# Patient Record
Sex: FEMALE | Race: OTHER | HISPANIC OR LATINO | URBAN - METROPOLITAN AREA
[De-identification: names, ages, dates, MRNs, and addresses within clinical notes are randomized per-mention and may not be internally consistent; named-entity substitution may affect disease eponyms.]

---

## 2022-07-18 ENCOUNTER — OUTPATIENT (OUTPATIENT)
Dept: OUTPATIENT SERVICES | Facility: HOSPITAL | Age: 37
LOS: 1 days | End: 2022-07-18
Payer: COMMERCIAL

## 2022-07-18 ENCOUNTER — TRANSCRIPTION ENCOUNTER (OUTPATIENT)
Age: 37
End: 2022-07-18

## 2022-07-18 DIAGNOSIS — Z11.52 ENCOUNTER FOR SCREENING FOR COVID-19: ICD-10-CM

## 2022-07-18 LAB — SARS-COV-2 RNA SPEC QL NAA+PROBE: DETECTED

## 2022-07-18 PROCEDURE — U0003: CPT

## 2022-07-18 PROCEDURE — C9803: CPT

## 2022-07-18 PROCEDURE — U0005: CPT

## 2022-07-19 ENCOUNTER — TRANSCRIPTION ENCOUNTER (OUTPATIENT)
Age: 37
End: 2022-07-19

## 2022-07-19 ENCOUNTER — OUTPATIENT (OUTPATIENT)
Dept: OUTPATIENT SERVICES | Facility: HOSPITAL | Age: 37
LOS: 1 days | End: 2022-07-19
Payer: COMMERCIAL

## 2022-07-19 ENCOUNTER — RESULT REVIEW (OUTPATIENT)
Age: 37
End: 2022-07-19

## 2022-07-19 VITALS
OXYGEN SATURATION: 100 % | HEART RATE: 71 BPM | DIASTOLIC BLOOD PRESSURE: 55 MMHG | RESPIRATION RATE: 16 BRPM | SYSTOLIC BLOOD PRESSURE: 99 MMHG | TEMPERATURE: 98 F

## 2022-07-19 VITALS
RESPIRATION RATE: 18 BRPM | DIASTOLIC BLOOD PRESSURE: 76 MMHG | HEART RATE: 100 BPM | TEMPERATURE: 98 F | SYSTOLIC BLOOD PRESSURE: 121 MMHG | HEIGHT: 61 IN | WEIGHT: 149.03 LBS | OXYGEN SATURATION: 100 %

## 2022-07-19 DIAGNOSIS — O02.1 MISSED ABORTION: ICD-10-CM

## 2022-07-19 LAB
APTT BLD: 26 SEC — LOW (ref 27.5–35.5)
BLD GP AB SCN SERPL QL: NEGATIVE — SIGNIFICANT CHANGE UP
FIBRINOGEN PPP-MCNC: 522 MG/DL — HIGH (ref 330–520)
HCT VFR BLD CALC: 42.7 % — SIGNIFICANT CHANGE UP (ref 34.5–45)
HGB BLD-MCNC: 14.2 G/DL — SIGNIFICANT CHANGE UP (ref 11.5–15.5)
INR BLD: 1.01 RATIO — SIGNIFICANT CHANGE UP (ref 0.88–1.16)
MCHC RBC-ENTMCNC: 28.5 PG — SIGNIFICANT CHANGE UP (ref 27–34)
MCHC RBC-ENTMCNC: 33.3 GM/DL — SIGNIFICANT CHANGE UP (ref 32–36)
MCV RBC AUTO: 85.7 FL — SIGNIFICANT CHANGE UP (ref 80–100)
NRBC # BLD: 0 /100 WBCS — SIGNIFICANT CHANGE UP (ref 0–0)
PLATELET # BLD AUTO: 185 K/UL — SIGNIFICANT CHANGE UP (ref 150–400)
PROTHROM AB SERPL-ACNC: 11.7 SEC — SIGNIFICANT CHANGE UP (ref 10.5–13.4)
RBC # BLD: 4.98 M/UL — SIGNIFICANT CHANGE UP (ref 3.8–5.2)
RBC # FLD: 13.1 % — SIGNIFICANT CHANGE UP (ref 10.3–14.5)
RH IG SCN BLD-IMP: POSITIVE — SIGNIFICANT CHANGE UP
RH IG SCN BLD-IMP: POSITIVE — SIGNIFICANT CHANGE UP
WBC # BLD: 6.5 K/UL — SIGNIFICANT CHANGE UP (ref 3.8–10.5)
WBC # FLD AUTO: 6.5 K/UL — SIGNIFICANT CHANGE UP (ref 3.8–10.5)

## 2022-07-19 PROCEDURE — 88305 TISSUE EXAM BY PATHOLOGIST: CPT | Mod: 26

## 2022-07-19 PROCEDURE — 85384 FIBRINOGEN ACTIVITY: CPT

## 2022-07-19 PROCEDURE — 59820 CARE OF MISCARRIAGE: CPT

## 2022-07-19 PROCEDURE — 36415 COLL VENOUS BLD VENIPUNCTURE: CPT

## 2022-07-19 PROCEDURE — 86850 RBC ANTIBODY SCREEN: CPT

## 2022-07-19 PROCEDURE — 88233 TISSUE CULTURE SKIN/BIOPSY: CPT

## 2022-07-19 PROCEDURE — 88305 TISSUE EXAM BY PATHOLOGIST: CPT

## 2022-07-19 PROCEDURE — 88280 CHROMOSOME KARYOTYPE STUDY: CPT

## 2022-07-19 PROCEDURE — 85027 COMPLETE CBC AUTOMATED: CPT

## 2022-07-19 PROCEDURE — 85610 PROTHROMBIN TIME: CPT

## 2022-07-19 PROCEDURE — 88264 CHROMOSOME ANALYSIS 20-25: CPT

## 2022-07-19 PROCEDURE — 86901 BLOOD TYPING SEROLOGIC RH(D): CPT

## 2022-07-19 PROCEDURE — 85730 THROMBOPLASTIN TIME PARTIAL: CPT

## 2022-07-19 PROCEDURE — 86900 BLOOD TYPING SEROLOGIC ABO: CPT

## 2022-07-19 RX ORDER — SODIUM CHLORIDE 9 MG/ML
1000 INJECTION, SOLUTION INTRAVENOUS
Refills: 0 | Status: DISCONTINUED | OUTPATIENT
Start: 2022-07-19 | End: 2022-07-19

## 2022-07-19 RX ORDER — ONDANSETRON 8 MG/1
4 TABLET, FILM COATED ORAL ONCE
Refills: 0 | Status: DISCONTINUED | OUTPATIENT
Start: 2022-07-19 | End: 2022-07-19

## 2022-07-19 RX ORDER — SODIUM CHLORIDE 9 MG/ML
3 INJECTION INTRAMUSCULAR; INTRAVENOUS; SUBCUTANEOUS EVERY 8 HOURS
Refills: 0 | Status: DISCONTINUED | OUTPATIENT
Start: 2022-07-19 | End: 2022-08-02

## 2022-07-19 RX ORDER — FENTANYL CITRATE 50 UG/ML
25 INJECTION INTRAVENOUS
Refills: 0 | Status: DISCONTINUED | OUTPATIENT
Start: 2022-07-19 | End: 2022-07-19

## 2022-07-19 NOTE — ASU DISCHARGE PLAN (ADULT/PEDIATRIC) - CARE PROVIDER_API CALL
Barbara Hill (DO)  [facility] [address], [address]  [place] [address]  Phone: [phone]  Fax: [phone]  Follow Up Time: 2 weeks

## 2022-07-19 NOTE — BRIEF OPERATIVE NOTE - OPERATION/FINDINGS
anteverted 11 week size uterus mobile, no FH confirmed with sono prior to procedure, normal adnexa B/L

## 2022-07-19 NOTE — H&P ADULT - HISTORY OF PRESENT ILLNESS
ROBBY BLAND  36y  Female [id number]    HPI: Pt is a 30yo  @12wks GA presenting for D&C for missed . Pt denies any complaints at this time. Denies fevers, chills, N/V, CP, SOB, abdominal pain, pelvic cramping, vaginal bleeding, vaginal discharge.    Obgyn: Dr. Jones  ObHx: FT C/S for failure of descent   PMH: denies  PSH: C/S , breast augmentation   Meds: PNV  Allx: NKDA  Social: occasional alcohol

## 2022-07-19 NOTE — ASU DISCHARGE PLAN (ADULT/PEDIATRIC) - ACTIVITY LEVEL
No excercise/No heavy lifting/No sports/gym/No weight bearing/Nothing per vagina/No tub baths/No douching/No tampons/No intercourse

## 2022-07-19 NOTE — BRIEF OPERATIVE NOTE - NSICDXBRIEFPROCEDURE_GEN_ALL_CORE_FT
PROCEDURES:  Exam under anesthesia, pelvic 19-Jul-2022 15:34:17  Barbara Hill  Dilation and curettage, uterus, using suction, with US guidance 19-Jul-2022 15:34:31  Barbara Hill

## 2022-07-19 NOTE — ASU DISCHARGE PLAN (ADULT/PEDIATRIC) - NO INTERCOURSE DURATION
.Injection given without difficulties.Bandaid applied. Patient instructed to stay in the clinic for 15 minutes. Patient verbalized understanding and will notify nurse with any complaints.    
4 weeks

## 2022-07-19 NOTE — H&P ADULT - NSHPPHYSICALEXAM_GEN_ALL_CORE
VS  T(C): 36.6 (07-19-22 @ 12:23)  HR: 100 (07-19-22 @ 12:23)  BP: 121/76 (07-19-22 @ 12:23)  RR: 18 (07-19-22 @ 12:23)  SpO2: 100% (07-19-22 @ 12:23)    Physical Exam:  General: NAD  Lungs: nonlabored breathing on RA  Abdomen: Soft, non-tender, non-distended

## 2022-07-19 NOTE — ASU PREOP CHECKLIST - PATIENT SENT TO
Please have the patient take an extra Lasix today and tomorrow along with an extra potassium today and tomorrow.  Please have the patient check a BMP on Monday or Tuesday of next week.  Order has been placed. maura   operating room

## 2022-07-19 NOTE — H&P ADULT - ASSESSMENT
Pt is a 37yo  @12wks GA presenting for D&C for MAB.    -NPO  -IV lock  -anesthesia consult  -pre-op labs ordered, T&S ordered    Kathrine, PGY3  Dr. Hill aware

## 2022-07-19 NOTE — ASU DISCHARGE PLAN (ADULT/PEDIATRIC) - CALL YOUR DOCTOR IF YOU HAVE ANY OF THE FOLLOWING:
Swelling that gets worse/Pain not relieved by Medications/Fever greater than (need to indicate Fahrenheit or Celsius)/Wound/Surgical Site with redness, or foul smelling discharge or pus/Unable to urinate/Excessive diarrhea/Inability to tolerate liquids or foods

## 2022-07-19 NOTE — ASU DISCHARGE PLAN (ADULT/PEDIATRIC) - NS MD DC FALL RISK RISK
For information on Fall & Injury Prevention, visit: [email or web address] OR  [email or web address] OR  https://www.cdc.gov/steadi/patient.html

## 2022-07-19 NOTE — H&P ADULT - ATTENDING COMMENTS
Patient seen. Her for missed AB 10w 4 days. No bleeding or cramping.     A+ blood type    Consents signed. Risks reviewed including bleeding infection damage to surrounding structures, need for laparascopy, scar tissue  Would like to send genetics  All questions answered    Barbara Hill DO

## 2022-07-22 LAB — SURGICAL PATHOLOGY STUDY: SIGNIFICANT CHANGE UP

## 2022-08-01 LAB — CHROM ANALY OVERALL INTERP SPEC-IMP: SIGNIFICANT CHANGE UP

## 2022-09-06 PROBLEM — Z00.00 ENCOUNTER FOR PREVENTIVE HEALTH EXAMINATION: Status: ACTIVE | Noted: 2022-09-06

## 2022-09-07 ENCOUNTER — APPOINTMENT (OUTPATIENT)
Dept: ANTEPARTUM | Facility: CLINIC | Age: 37
End: 2022-09-07

## 2023-06-21 ENCOUNTER — ASOB RESULT (OUTPATIENT)
Age: 38
End: 2023-06-21

## 2023-06-21 ENCOUNTER — APPOINTMENT (OUTPATIENT)
Dept: ANTEPARTUM | Facility: CLINIC | Age: 38
End: 2023-06-21
Payer: COMMERCIAL

## 2023-06-21 ENCOUNTER — NON-APPOINTMENT (OUTPATIENT)
Age: 38
End: 2023-06-21

## 2023-06-21 PROCEDURE — 76811 OB US DETAILED SNGL FETUS: CPT

## 2023-06-21 PROCEDURE — 76817 TRANSVAGINAL US OBSTETRIC: CPT | Mod: 59

## 2023-08-17 ENCOUNTER — OUTPATIENT (OUTPATIENT)
Dept: OUTPATIENT SERVICES | Facility: HOSPITAL | Age: 38
LOS: 1 days | End: 2023-08-17
Payer: COMMERCIAL

## 2023-08-17 ENCOUNTER — APPOINTMENT (OUTPATIENT)
Dept: ULTRASOUND IMAGING | Facility: CLINIC | Age: 38
End: 2023-08-17
Payer: COMMERCIAL

## 2023-08-17 DIAGNOSIS — N63.0 UNSPECIFIED LUMP IN UNSPECIFIED BREAST: ICD-10-CM

## 2023-08-17 PROCEDURE — 76641 ULTRASOUND BREAST COMPLETE: CPT | Mod: 26,50

## 2023-08-17 PROCEDURE — 76641 ULTRASOUND BREAST COMPLETE: CPT

## 2023-09-13 ENCOUNTER — APPOINTMENT (OUTPATIENT)
Dept: ANTEPARTUM | Facility: CLINIC | Age: 38
End: 2023-09-13
Payer: COMMERCIAL

## 2023-09-13 ENCOUNTER — ASOB RESULT (OUTPATIENT)
Age: 38
End: 2023-09-13

## 2023-09-13 PROCEDURE — 76816 OB US FOLLOW-UP PER FETUS: CPT

## 2023-10-19 ENCOUNTER — OUTPATIENT (OUTPATIENT)
Dept: OUTPATIENT SERVICES | Facility: HOSPITAL | Age: 38
LOS: 1 days | End: 2023-10-19
Payer: COMMERCIAL

## 2023-10-19 VITALS
OXYGEN SATURATION: 100 % | HEIGHT: 61 IN | RESPIRATION RATE: 15 BRPM | HEART RATE: 99 BPM | WEIGHT: 293 LBS | DIASTOLIC BLOOD PRESSURE: 81 MMHG | SYSTOLIC BLOOD PRESSURE: 114 MMHG | TEMPERATURE: 98 F

## 2023-10-19 DIAGNOSIS — Z98.82 BREAST IMPLANT STATUS: Chronic | ICD-10-CM

## 2023-10-19 DIAGNOSIS — O34.211 MATERNAL CARE FOR LOW TRANSVERSE SCAR FROM PREVIOUS CESAREAN DELIVERY: ICD-10-CM

## 2023-10-19 DIAGNOSIS — Z98.890 OTHER SPECIFIED POSTPROCEDURAL STATES: Chronic | ICD-10-CM

## 2023-10-19 DIAGNOSIS — Z98.891 HISTORY OF UTERINE SCAR FROM PREVIOUS SURGERY: ICD-10-CM

## 2023-10-19 DIAGNOSIS — Z98.891 HISTORY OF UTERINE SCAR FROM PREVIOUS SURGERY: Chronic | ICD-10-CM

## 2023-10-19 DIAGNOSIS — Z01.818 ENCOUNTER FOR OTHER PREPROCEDURAL EXAMINATION: ICD-10-CM

## 2023-10-19 LAB
BLD GP AB SCN SERPL QL: NEGATIVE — SIGNIFICANT CHANGE UP
BLD GP AB SCN SERPL QL: NEGATIVE — SIGNIFICANT CHANGE UP
HCT VFR BLD CALC: 40.6 % — SIGNIFICANT CHANGE UP (ref 34.5–45)
HCT VFR BLD CALC: 40.6 % — SIGNIFICANT CHANGE UP (ref 34.5–45)
HGB BLD-MCNC: 13.8 G/DL — SIGNIFICANT CHANGE UP (ref 11.5–15.5)
HGB BLD-MCNC: 13.8 G/DL — SIGNIFICANT CHANGE UP (ref 11.5–15.5)
MCHC RBC-ENTMCNC: 29.4 PG — SIGNIFICANT CHANGE UP (ref 27–34)
MCHC RBC-ENTMCNC: 29.4 PG — SIGNIFICANT CHANGE UP (ref 27–34)
MCHC RBC-ENTMCNC: 34 GM/DL — SIGNIFICANT CHANGE UP (ref 32–36)
MCHC RBC-ENTMCNC: 34 GM/DL — SIGNIFICANT CHANGE UP (ref 32–36)
MCV RBC AUTO: 86.4 FL — SIGNIFICANT CHANGE UP (ref 80–100)
MCV RBC AUTO: 86.4 FL — SIGNIFICANT CHANGE UP (ref 80–100)
NRBC # BLD: 0 /100 WBCS — SIGNIFICANT CHANGE UP (ref 0–0)
NRBC # BLD: 0 /100 WBCS — SIGNIFICANT CHANGE UP (ref 0–0)
PLATELET # BLD AUTO: 178 K/UL — SIGNIFICANT CHANGE UP (ref 150–400)
PLATELET # BLD AUTO: 178 K/UL — SIGNIFICANT CHANGE UP (ref 150–400)
RBC # BLD: 4.7 M/UL — SIGNIFICANT CHANGE UP (ref 3.8–5.2)
RBC # BLD: 4.7 M/UL — SIGNIFICANT CHANGE UP (ref 3.8–5.2)
RBC # FLD: 14 % — SIGNIFICANT CHANGE UP (ref 10.3–14.5)
RBC # FLD: 14 % — SIGNIFICANT CHANGE UP (ref 10.3–14.5)
RH IG SCN BLD-IMP: POSITIVE — SIGNIFICANT CHANGE UP
RH IG SCN BLD-IMP: POSITIVE — SIGNIFICANT CHANGE UP
WBC # BLD: 13.41 K/UL — HIGH (ref 3.8–10.5)
WBC # BLD: 13.41 K/UL — HIGH (ref 3.8–10.5)
WBC # FLD AUTO: 13.41 K/UL — HIGH (ref 3.8–10.5)
WBC # FLD AUTO: 13.41 K/UL — HIGH (ref 3.8–10.5)

## 2023-10-19 PROCEDURE — 86850 RBC ANTIBODY SCREEN: CPT

## 2023-10-19 PROCEDURE — 36415 COLL VENOUS BLD VENIPUNCTURE: CPT

## 2023-10-19 PROCEDURE — 86901 BLOOD TYPING SEROLOGIC RH(D): CPT

## 2023-10-19 PROCEDURE — 86900 BLOOD TYPING SEROLOGIC ABO: CPT

## 2023-10-19 PROCEDURE — 85027 COMPLETE CBC AUTOMATED: CPT

## 2023-10-19 PROCEDURE — G0463: CPT

## 2023-10-19 RX ORDER — LEVOTHYROXINE SODIUM 125 MCG
1 TABLET ORAL
Refills: 0 | DISCHARGE

## 2023-10-19 RX ORDER — SODIUM CHLORIDE 9 MG/ML
1000 INJECTION, SOLUTION INTRAVENOUS
Refills: 0 | Status: DISCONTINUED | OUTPATIENT
Start: 2023-11-03 | End: 2023-11-03

## 2023-10-19 NOTE — OB PST NOTE - HISTORY OF PRESENT ILLNESS
36yo  @12wks GA presenting for D&C for missed . Pt denies any complaints at this time. Denies fevers, chills, N/V, CP, SOB, abdominal pain, pelvic cramping, vaginal bleeding, vaginal discharge.   36yo  BOBBY @37ks SONU SMITH c/section, presents to PST scheduled for repeat c/section on 11/3.  36yo  IUP @37wks GA. PMH hypothyroid. PSH c/section, current pregnancy uneventful, pt stated she was taking baby aspirin prior to 36 weeks gestation due to her age and the latest research study. now presents to PST scheduled for repeat c/section on 11/3.

## 2023-10-19 NOTE — OB PST NOTE - NSICDXPASTSURGICALHX_GEN_ALL_CORE_FT
PAST SURGICAL HISTORY:  S/P  section     S/P D&C (status post dilation and curettage)      PAST SURGICAL HISTORY:  H/O breast augmentation     S/P  section     S/P D&C (status post dilation and curettage)

## 2023-10-19 NOTE — OB PST NOTE - FALL HARM RISK - UNIVERSAL INTERVENTIONS
Bed in lowest position, wheels locked, appropriate side rails in place/Call bell, personal items and telephone in reach/Instruct patient to call for assistance before getting out of bed or chair/Non-slip footwear when patient is out of bed/[place] to call system/Purposeful Proactive Rounding/Room/bathroom lighting operational, light cord in reach

## 2023-10-19 NOTE — OB PST NOTE - ACTIVITY
prior to pregnancy, no activity limitation prior to pregnancy, no activity limitation, able to jog 1 block

## 2023-11-02 ENCOUNTER — TRANSCRIPTION ENCOUNTER (OUTPATIENT)
Age: 38
End: 2023-11-02

## 2023-11-03 ENCOUNTER — TRANSCRIPTION ENCOUNTER (OUTPATIENT)
Age: 38
End: 2023-11-03

## 2023-11-03 ENCOUNTER — INPATIENT (INPATIENT)
Facility: HOSPITAL | Age: 38
LOS: 2 days | Discharge: HOME | End: 2023-11-06
Attending: OBSTETRICS & GYNECOLOGY | Admitting: OBSTETRICS & GYNECOLOGY
Payer: COMMERCIAL

## 2023-11-03 VITALS — DIASTOLIC BLOOD PRESSURE: 76 MMHG | HEART RATE: 95 BPM | SYSTOLIC BLOOD PRESSURE: 126 MMHG

## 2023-11-03 DIAGNOSIS — Z98.890 OTHER SPECIFIED POSTPROCEDURAL STATES: Chronic | ICD-10-CM

## 2023-11-03 DIAGNOSIS — Z98.891 HISTORY OF UTERINE SCAR FROM PREVIOUS SURGERY: Chronic | ICD-10-CM

## 2023-11-03 DIAGNOSIS — Z98.82 BREAST IMPLANT STATUS: Chronic | ICD-10-CM

## 2023-11-03 DIAGNOSIS — O34.211 MATERNAL CARE FOR LOW TRANSVERSE SCAR FROM PREVIOUS CESAREAN DELIVERY: ICD-10-CM

## 2023-11-03 LAB
APTT BLD: 25 SEC — SIGNIFICANT CHANGE UP (ref 24.5–35.6)
APTT BLD: 25 SEC — SIGNIFICANT CHANGE UP (ref 24.5–35.6)
FIBRINOGEN PPP-MCNC: 352 MG/DL — SIGNIFICANT CHANGE UP (ref 200–445)
FIBRINOGEN PPP-MCNC: 352 MG/DL — SIGNIFICANT CHANGE UP (ref 200–445)
HCT VFR BLD CALC: 34.5 % — SIGNIFICANT CHANGE UP (ref 34.5–45)
HCT VFR BLD CALC: 34.5 % — SIGNIFICANT CHANGE UP (ref 34.5–45)
HGB BLD-MCNC: 11.6 G/DL — SIGNIFICANT CHANGE UP (ref 11.5–15.5)
HGB BLD-MCNC: 11.6 G/DL — SIGNIFICANT CHANGE UP (ref 11.5–15.5)
INR BLD: 0.94 RATIO — SIGNIFICANT CHANGE UP (ref 0.85–1.18)
INR BLD: 0.94 RATIO — SIGNIFICANT CHANGE UP (ref 0.85–1.18)
MCHC RBC-ENTMCNC: 28.9 PG — SIGNIFICANT CHANGE UP (ref 27–34)
MCHC RBC-ENTMCNC: 28.9 PG — SIGNIFICANT CHANGE UP (ref 27–34)
MCHC RBC-ENTMCNC: 33.6 GM/DL — SIGNIFICANT CHANGE UP (ref 32–36)
MCHC RBC-ENTMCNC: 33.6 GM/DL — SIGNIFICANT CHANGE UP (ref 32–36)
MCV RBC AUTO: 86 FL — SIGNIFICANT CHANGE UP (ref 80–100)
MCV RBC AUTO: 86 FL — SIGNIFICANT CHANGE UP (ref 80–100)
NRBC # BLD: 0 /100 WBCS — SIGNIFICANT CHANGE UP (ref 0–0)
NRBC # BLD: 0 /100 WBCS — SIGNIFICANT CHANGE UP (ref 0–0)
PLATELET # BLD AUTO: 177 K/UL — SIGNIFICANT CHANGE UP (ref 150–400)
PLATELET # BLD AUTO: 177 K/UL — SIGNIFICANT CHANGE UP (ref 150–400)
PROTHROM AB SERPL-ACNC: 10.4 SEC — SIGNIFICANT CHANGE UP (ref 9.5–13)
PROTHROM AB SERPL-ACNC: 10.4 SEC — SIGNIFICANT CHANGE UP (ref 9.5–13)
RBC # BLD: 4.01 M/UL — SIGNIFICANT CHANGE UP (ref 3.8–5.2)
RBC # BLD: 4.01 M/UL — SIGNIFICANT CHANGE UP (ref 3.8–5.2)
RBC # FLD: 13.8 % — SIGNIFICANT CHANGE UP (ref 10.3–14.5)
RBC # FLD: 13.8 % — SIGNIFICANT CHANGE UP (ref 10.3–14.5)
T PALLIDUM AB TITR SER: NEGATIVE — SIGNIFICANT CHANGE UP
T PALLIDUM AB TITR SER: NEGATIVE — SIGNIFICANT CHANGE UP
WBC # BLD: 19.84 K/UL — HIGH (ref 3.8–10.5)
WBC # BLD: 19.84 K/UL — HIGH (ref 3.8–10.5)
WBC # FLD AUTO: 19.84 K/UL — HIGH (ref 3.8–10.5)
WBC # FLD AUTO: 19.84 K/UL — HIGH (ref 3.8–10.5)

## 2023-11-03 PROCEDURE — 88302 TISSUE EXAM BY PATHOLOGIST: CPT | Mod: 26

## 2023-11-03 DEVICE — SURGICEL POWDER 3 GRAMS: Type: IMPLANTABLE DEVICE | Status: FUNCTIONAL

## 2023-11-03 RX ORDER — ONDANSETRON 8 MG/1
4 TABLET, FILM COATED ORAL EVERY 6 HOURS
Refills: 0 | Status: DISCONTINUED | OUTPATIENT
Start: 2023-11-03 | End: 2023-11-06

## 2023-11-03 RX ORDER — SODIUM CHLORIDE 9 MG/ML
1000 INJECTION, SOLUTION INTRAVENOUS
Refills: 0 | Status: DISCONTINUED | OUTPATIENT
Start: 2023-11-03 | End: 2023-11-06

## 2023-11-03 RX ORDER — IBUPROFEN 200 MG
600 TABLET ORAL EVERY 6 HOURS
Refills: 0 | Status: DISCONTINUED | OUTPATIENT
Start: 2023-11-03 | End: 2023-11-06

## 2023-11-03 RX ORDER — DEXAMETHASONE 0.5 MG/5ML
4 ELIXIR ORAL EVERY 6 HOURS
Refills: 0 | Status: DISCONTINUED | OUTPATIENT
Start: 2023-11-03 | End: 2023-11-06

## 2023-11-03 RX ORDER — CITRIC ACID/SODIUM CITRATE 300-500 MG
15 SOLUTION, ORAL ORAL ONCE
Refills: 0 | Status: COMPLETED | OUTPATIENT
Start: 2023-11-03 | End: 2023-11-03

## 2023-11-03 RX ORDER — CEFAZOLIN SODIUM 1 G
2000 VIAL (EA) INJECTION ONCE
Refills: 0 | Status: COMPLETED | OUTPATIENT
Start: 2023-11-03 | End: 2023-11-03

## 2023-11-03 RX ORDER — OXYCODONE HYDROCHLORIDE 5 MG/1
5 TABLET ORAL
Refills: 0 | Status: COMPLETED | OUTPATIENT
Start: 2023-11-03 | End: 2023-11-10

## 2023-11-03 RX ORDER — LANOLIN
1 OINTMENT (GRAM) TOPICAL EVERY 6 HOURS
Refills: 0 | Status: DISCONTINUED | OUTPATIENT
Start: 2023-11-03 | End: 2023-11-06

## 2023-11-03 RX ORDER — KETOROLAC TROMETHAMINE 30 MG/ML
30 SYRINGE (ML) INJECTION EVERY 6 HOURS
Refills: 0 | Status: DISCONTINUED | OUTPATIENT
Start: 2023-11-03 | End: 2023-11-04

## 2023-11-03 RX ORDER — IBUPROFEN 200 MG
600 TABLET ORAL EVERY 6 HOURS
Refills: 0 | Status: COMPLETED | OUTPATIENT
Start: 2023-11-03 | End: 2024-10-01

## 2023-11-03 RX ORDER — LEVOTHYROXINE SODIUM 125 MCG
25 TABLET ORAL DAILY
Refills: 0 | Status: DISCONTINUED | OUTPATIENT
Start: 2023-11-03 | End: 2023-11-06

## 2023-11-03 RX ORDER — OXYTOCIN 10 UNIT/ML
333.33 VIAL (ML) INJECTION
Qty: 20 | Refills: 0 | Status: COMPLETED | OUTPATIENT
Start: 2023-11-03 | End: 2023-11-03

## 2023-11-03 RX ORDER — DIPHENHYDRAMINE HCL 50 MG
25 CAPSULE ORAL EVERY 6 HOURS
Refills: 0 | Status: DISCONTINUED | OUTPATIENT
Start: 2023-11-03 | End: 2023-11-06

## 2023-11-03 RX ORDER — INFLUENZA VIRUS VACCINE 15; 15; 15; 15 UG/.5ML; UG/.5ML; UG/.5ML; UG/.5ML
0.5 SUSPENSION INTRAMUSCULAR ONCE
Refills: 0 | Status: DISCONTINUED | OUTPATIENT
Start: 2023-11-03 | End: 2023-11-06

## 2023-11-03 RX ORDER — MAGNESIUM HYDROXIDE 400 MG/1
30 TABLET, CHEWABLE ORAL
Refills: 0 | Status: DISCONTINUED | OUTPATIENT
Start: 2023-11-03 | End: 2023-11-06

## 2023-11-03 RX ORDER — OXYTOCIN 10 UNIT/ML
333.33 VIAL (ML) INJECTION
Qty: 20 | Refills: 0 | Status: DISCONTINUED | OUTPATIENT
Start: 2023-11-03 | End: 2023-11-06

## 2023-11-03 RX ORDER — ACETAMINOPHEN 500 MG
975 TABLET ORAL
Refills: 0 | Status: DISCONTINUED | OUTPATIENT
Start: 2023-11-03 | End: 2023-11-06

## 2023-11-03 RX ORDER — CHLORHEXIDINE GLUCONATE 213 G/1000ML
1 SOLUTION TOPICAL ONCE
Refills: 0 | Status: COMPLETED | OUTPATIENT
Start: 2023-11-03 | End: 2023-11-03

## 2023-11-03 RX ORDER — MORPHINE SULFATE 50 MG/1
0.1 CAPSULE, EXTENDED RELEASE ORAL ONCE
Refills: 0 | Status: DISCONTINUED | OUTPATIENT
Start: 2023-11-03 | End: 2023-11-04

## 2023-11-03 RX ORDER — TETANUS TOXOID, REDUCED DIPHTHERIA TOXOID AND ACELLULAR PERTUSSIS VACCINE, ADSORBED 5; 2.5; 8; 8; 2.5 [IU]/.5ML; [IU]/.5ML; UG/.5ML; UG/.5ML; UG/.5ML
0.5 SUSPENSION INTRAMUSCULAR ONCE
Refills: 0 | Status: DISCONTINUED | OUTPATIENT
Start: 2023-11-03 | End: 2023-11-06

## 2023-11-03 RX ORDER — FAMOTIDINE 10 MG/ML
20 INJECTION INTRAVENOUS ONCE
Refills: 0 | Status: COMPLETED | OUTPATIENT
Start: 2023-11-03 | End: 2023-11-03

## 2023-11-03 RX ORDER — NALBUPHINE HYDROCHLORIDE 10 MG/ML
2.5 INJECTION, SOLUTION INTRAMUSCULAR; INTRAVENOUS; SUBCUTANEOUS EVERY 6 HOURS
Refills: 0 | Status: DISCONTINUED | OUTPATIENT
Start: 2023-11-03 | End: 2023-11-06

## 2023-11-03 RX ORDER — OXYCODONE HYDROCHLORIDE 5 MG/1
5 TABLET ORAL
Refills: 0 | Status: DISCONTINUED | OUTPATIENT
Start: 2023-11-03 | End: 2023-11-03

## 2023-11-03 RX ORDER — HEPARIN SODIUM 5000 [USP'U]/ML
5000 INJECTION INTRAVENOUS; SUBCUTANEOUS EVERY 12 HOURS
Refills: 0 | Status: DISCONTINUED | OUTPATIENT
Start: 2023-11-03 | End: 2023-11-06

## 2023-11-03 RX ORDER — SIMETHICONE 80 MG/1
80 TABLET, CHEWABLE ORAL EVERY 4 HOURS
Refills: 0 | Status: DISCONTINUED | OUTPATIENT
Start: 2023-11-03 | End: 2023-11-06

## 2023-11-03 RX ORDER — OXYCODONE HYDROCHLORIDE 5 MG/1
5 TABLET ORAL ONCE
Refills: 0 | Status: DISCONTINUED | OUTPATIENT
Start: 2023-11-03 | End: 2023-11-06

## 2023-11-03 RX ORDER — NALOXONE HYDROCHLORIDE 4 MG/.1ML
0.1 SPRAY NASAL
Refills: 0 | Status: DISCONTINUED | OUTPATIENT
Start: 2023-11-03 | End: 2023-11-06

## 2023-11-03 RX ADMIN — Medication 30 MILLIGRAM(S): at 20:15

## 2023-11-03 RX ADMIN — Medication 15 MILLILITER(S): at 07:16

## 2023-11-03 RX ADMIN — Medication 975 MILLIGRAM(S): at 23:35

## 2023-11-03 RX ADMIN — Medication 30 MILLIGRAM(S): at 20:50

## 2023-11-03 RX ADMIN — FAMOTIDINE 20 MILLIGRAM(S): 10 INJECTION INTRAVENOUS at 07:16

## 2023-11-03 RX ADMIN — CHLORHEXIDINE GLUCONATE 1 APPLICATION(S): 213 SOLUTION TOPICAL at 06:00

## 2023-11-03 RX ADMIN — SODIUM CHLORIDE 125 MILLILITER(S): 9 INJECTION, SOLUTION INTRAVENOUS at 07:16

## 2023-11-03 RX ADMIN — Medication 975 MILLIGRAM(S): at 18:21

## 2023-11-03 RX ADMIN — Medication 30 MILLIGRAM(S): at 18:21

## 2023-11-03 RX ADMIN — HEPARIN SODIUM 5000 UNIT(S): 5000 INJECTION INTRAVENOUS; SUBCUTANEOUS at 20:15

## 2023-11-03 RX ADMIN — Medication 1000 MILLIUNIT(S)/MIN: at 12:09

## 2023-11-03 RX ADMIN — Medication 30 MILLIGRAM(S): at 15:09

## 2023-11-03 RX ADMIN — Medication 975 MILLIGRAM(S): at 17:38

## 2023-11-03 NOTE — OB PROVIDER H&P - HISTORY OF PRESENT ILLNESS
Admission H&P    Subjective  HPI: 37yoF  @39w5d gestational age presents for scheduled repeat  section. +FM. -LOF. -CTXs. -VB. Pt denies any other concerns.    – PNC: denies prenatal issues. GBS _.  EFW _g by sono.    – OB Hx:  G1 (): FT primary c/s, arrest of descent  G2 (): MAB s/p D&C  – GYN Hx: denies abnormal pap smears, fibroids, polyps, ovarian cysts, PCOS, Endometriosis, STIs    – PMH: hypothyroid  – Meds: PNV, bASA  – Allergies: NKDA  – PSHx: C/s x1, D&C  – Social: social alcohol prior to pregnancy  – Will accept blood transfusions? Yes Admission H&P    Subjective  HPI: 37yoF  @39w5d gestational age presents for scheduled repeat  section and bilateral salpingectoym. +FM. -LOF. -CTXs. -VB. Pt denies any other concerns.    – PNC: denies prenatal issues. GBS pos.  EFW 3400g by sonnida this week.    – OB Hx:  G1 (): FT primary c/s, arrest of descent  G2 (): MAB s/p D&C  – GYN Hx: denies abnormal pap smears, fibroids, polyps, ovarian cysts, PCOS, Endometriosis, STIs    – PMH: hypothyroid  – Meds: PNV, bASA, Synthroid 25mcg daily  – Allergies: NKDA  – PSHx: C/s x1, D&C  – Social: social alcohol prior to pregnancy  – Will accept blood transfusions? Yes

## 2023-11-03 NOTE — OB PROVIDER H&P - NS ATTEND AMEND GEN_ALL_CORE FT
P1 at 39+ weeks for rpt section and desired sterilization  fetal status reassuring  VSS  risks include but not limited to infection organ damage need for transfusion hysterectomy - regret from tubal removal   consents obtained  anterior placenta - cephalic    Susan Jones MD

## 2023-11-03 NOTE — DISCHARGE NOTE OB - MEDICATION SUMMARY - MEDICATIONS TO TAKE
I will START or STAY ON the medications listed below when I get home from the hospital:    ibuprofen 600 mg oral tablet  -- 1 tab(s) by mouth every 6 hours  -- Indication: For pain    acetaminophen 325 mg oral tablet  -- 3 tab(s) by mouth 3 times a day as needed for  moderate pain  -- Indication: For pain    Roxicodone 5 mg oral tablet  -- 1 tab(s) by mouth every 3 hours As needed Moderate to Severe Pain (4-10)  -- Indication: For Severe pain    Prenatal 1 oral capsule  -- orally once a day  -- Indication: For wellness    ferrous sulfate 325 mg (65 mg elemental iron) oral tablet  -- 1 tab(s) by mouth once a day  -- Indication: For anemia    Synthroid 25 mcg (0.025 mg) oral tablet  -- 1 tab(s) by mouth once a day  -- Indication: For Hypothyroid

## 2023-11-03 NOTE — DISCHARGE NOTE OB - HOSPITAL COURSE
Pt admitted  uncomplicated section  Normal post op course  VSS and afebrile  incision healing well  min lochia  d/c home pod3  instructions given - pain meds reviewed  f/u 2 weeks for post op check

## 2023-11-03 NOTE — OB PROVIDER DELIVERY SUMMARY - NSPROVIDERDELIVERYNOTE_OBGYN_ALL_OB_FT
Dense adhesions of rectus muscle to the uterus  normal tubes and uterus  boy cephalic, clear fluid, spontaneous cry and tone on delivery - loose nuchal x1, apgars 9-9 weight 8lb9oz  QBL 1644  IVF 2.4L  uo 250cc  retrograde fill of bladder - intact  surgicell poweder placed  2grams ancef Dense adhesions of rectus muscle to the uterus  normal tubes and uterus  boy cephalic, clear fluid, spontaneous cry and tone on delivery - loose nuchal x1, apgars 9-9 weight 8lb9oz  QBL 1644  IVF 2.4L  uo 250cc  retrograde fill of bladder - intact  surgicell poweder placed  2grams ancef  [id number]

## 2023-11-03 NOTE — OB PROVIDER H&P - NSHPPHYSICALEXAM_GEN_ALL_CORE
Objective    Vital Signs Last 24 Hrs  HR: 95 (03 Nov 2023 06:15) (95 - 95)  BP: 126/76 (03 Nov 2023 06:15) (126/76 - 126/76)    – Physical exam  CV: extremities well perfused  Pulm: normal respiratory effort on room air  Abd: gravid, nontender  Extr: moving all extremities with ease    – FHT: baseline 140, mod variability, +accels, -decels  – Wilmer: acontractile    – Sono: vertex

## 2023-11-03 NOTE — DISCHARGE NOTE OB - CARE PLAN
1 Principal Discharge DX:	Delivery by elective  section  Assessment and plan of treatment:	nothing per vagina, and no heavy lifting

## 2023-11-03 NOTE — OB PROVIDER DELIVERY SUMMARY - NSSELHIDDEN_OBGYN_ALL_OB_FT
[NS_DeliveryAttending1_OBGYN_ALL_OB_FT:[id number]],[NS_DeliveryAssist1_OBGYN_ALL_OB_FT:[id number]]

## 2023-11-03 NOTE — OB PROVIDER H&P - NSLOWPPHRISK_OBGYN_A_OB
Leon Pregnancy/Less than or equal to 4 previous vaginal births/No known bleeding disorder/No history of postpartum hemorrhage

## 2023-11-03 NOTE — OB RN DELIVERY SUMMARY - NS_SEPSISRSKCALC_OBGYN_ALL_OB_FT
EOS calculated successfully. EOS Risk Factor: 0.5/1000 live births ([facility] national incidence); GA=39w5d; Temp=98.4; ROM=0; GBS='Positive'; Antibiotics='No antibiotics or any antibiotics < 2 hrs prior to birth'

## 2023-11-03 NOTE — OB RN PATIENT PROFILE - NS_PRENATALSTART_OBGYN_ALL_OB
Chief Complaint   Patient presents with   • Follow-up       HISTORY OF PRESENT ILLNESS:   Cielo presents for evaluation of multiple medical issues.  She feels better overall with breathing issues improved.  Follows with pulmonary and sleep medicine.    Past Medical History:   Diagnosis Date   • Blood clot associated with vein wall inflammation    • Diabetes mellitus (CMS/HCC)    • Essential (primary) hypertension    • Gastroesophageal reflux disease    • High cholesterol    • IBS (irritable bowel syndrome)    • RAD (reactive airway disease)        Current Outpatient Medications   Medication Sig   • dilTIAZem (dilTIAZem CD) 120 MG 24 hr capsule Take 1 capsule by mouth daily.   • apixaBAN (ELIQUIS) 5 MG Tab Take 1 tablet by mouth every 12 hours.   • VITAMIN D, CHOLECALCIFEROL, PO Take 5,000 Units by mouth daily.   • furosemide (LASIX) 40 MG tablet Take 1 tablet by mouth daily.   • losartan (COZAAR) 100 MG tablet Take 1 tablet by mouth every morning.   • omeprazole (PRILOSEC) 20 MG capsule Take 1 capsule by mouth daily.   • budesonide-formoterol (SYMBICORT) 160-4.5 MCG/ACT inhaler Inhale 2 puffs into the lungs 2 times daily.   • albuterol 108 (90 Base) MCG/ACT inhaler Inhale 2 puffs into the lungs every 4 hours as needed for Shortness of Breath or Wheezing.   • pravastatin (PRAVACHOL) 40 MG tablet Take 1 tablet by mouth daily. D/c crestor   • glipiZIDE (GLUCOTROL) 5 MG 24 hr tablet Take 1 tablet by mouth daily.   • Probiotic Product (VSL#3) capsule Take 1 capsule by mouth daily.   • gabapentin (NEURONTIN) 300 MG capsule Take 2 capsules by mouth 3 times daily.   • loperamide (IMODIUM) 2 MG capsule Take 1 capsule by mouth 4 times daily as needed for Diarrhea.   • Specialty Vitamins Products (WEIGHT LOSS DAILY MULTI) Tab Take 2 tablets by mouth daily.    • melatonin 3 MG Take 3 mg by mouth nightly.    • potassium chloride (KLOR-CON) 20 MEQ packet Take 20 mEq by mouth 2 times daily.   • blood glucose (ONE TOUCH ULTRA  TEST) test strip Test strips to go with Glucometer -Test blood sugar 4-6 times daily as directed. Diagnosis: E 11.9. Meter: Glucometer covered by pt's insur   • blood glucose meter Test blood sugar 4-6 times daily as directed. Diagnosis: E 11.9. Meter: Glucometer as covered by Pt's insurance   • Lancets 30G Misc To use as directed to check blood sugars 4-6 times daily Dx: E 11.9   • Continuous Blood Gluc Sensor (FREESTYLE KRYSTLE 14 DAY SENSOR) Misc 1 Device every 14 days. Check blood sugar once daily at varying times.   • Continuous Blood Gluc  (FREESTYLE KRYSTLE 14 DAY READER) Device 1 Device every 14 days. Check blood sugars once daily at varying times.   • DIAZepam (VALIUM) 10 MG tablet Take 1 tablet by mouth 2 times daily as needed for Anxiety.   • oxygen (O2) gas Inhale 2 L/min into the lungs continuous. at HS with Cpap    • bacitracin ointment Apply topically as needed (as needed to skin irritation).   • NON FORMULARY Patient utilizing the ideal weight diet. Taking a 2 multi vitamins daily. 4 Calcium and Magnesium tablets daily. Sea Salt 1/4 tsp daily.   • Glucosamine-Chondroitin 500-250 MG Cap Take 1 tablet by mouth daily.   • Hydrocod Polst-Chlorphen Polst 10-8 MG/5ML Suspension Extended Release Take 5 mLs by mouth 2 times daily as needed for Cough. Shake well     No current facility-administered medications for this visit.        ALLERGIES:   Allergen Reactions   • Morphine PRURITUS   • Atorvastatin MYALGIA   • Doxycycline MYALGIA   • Molds & Smuts Other (See Comments)     Increases SOB   • Naproxen RASH   • Rosuvastatin MYALGIA   • Seasonal Other (See Comments)   • Tetracycline Other (See Comments)     States it burns her anus after taking.         Tobacco Use: Never             Vitals:    09/10/20 0853   BP: 124/70   Pulse: 66   Weight: (!) 154 kg       Wt Readings from Last 4 Encounters:   09/10/20 (!) 154 kg   06/25/20 (!) 158.7 kg   01/28/20 (!) 154.2 kg   12/18/19 (!) 152 kg       REVIEW OF  SYSTEMS:  Pertinent review of systems above in History of Present Illness.    Health Maintenance Due   Topic Date Due   • Hepatitis B Vaccine (1 of 3 - Risk 3-dose series) 04/11/1981   • Diabetes Foot Exam  06/05/2019   • Diabetes Eye Exam  06/10/2020   • Influenza Vaccine (1) 09/01/2020   • Medicare Wellness Visit  12/03/2020   • Depression Screening  12/03/2020       Patient is due for medicare wellness this December.  Diabetes eye exam was put off due to COVID-19.  Flu vaccine accepted today.      PHYSICAL EXAM:   GENERAL:  Patient is a 56 year old female who presents in no acute distress.  Patient is obese, well nourished, alert and oriented to person, place, and thing.  HEAD:  Normocephalic, atraumatic.  EYES:  Pupils equal, round, reactive to light and accommodation, extraocular movements intact, conjunctivae clear, sclerae white.  NECK:  Supple, no thyromegaly, no cervical or supraclavicular lymphadenopathy.  CHEST:  Clear to auscultation bilaterally, no wheeze, no rales, no visible use of accessory muscles.  CARDIOVASCULAR:  Regular rate and rhythm, no murmur, splits, clicks or rubs, no edema.  ABDOMEN:  Round, soft, normoactive bowel sounds, tympanic upon percussion, no hepatosplenomegaly, no costovertebral angle tenderness, no guarding.   MUSCULOSKELETAL:  Full range of motion, no deformity.  Skin tag back of left leg is benign appearing.    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       Started first trimester

## 2023-11-03 NOTE — OB RN DELIVERY SUMMARY - NSSELHIDDEN_OBGYN_ALL_OB_FT
[NS_DeliveryAttending1_OBGYN_ALL_OB_FT:[id number]],[NS_DeliveryAssist1_OBGYN_ALL_OB_FT:[id number]]

## 2023-11-03 NOTE — OB RN PATIENT PROFILE - NSICDXPASTSURGICALHX_GEN_ALL_CORE_FT
PAST SURGICAL HISTORY:  H/O breast augmentation     S/P  section     S/P D&C (status post dilation and curettage)

## 2023-11-03 NOTE — DISCHARGE NOTE OB - CARE PROVIDER_API CALL
Susan Jones  Obstetrics and Gynecology  [address], [address]  [place] [address]  Phone: [phone]  Fax: [phone]  Follow Up Time:

## 2023-11-03 NOTE — DISCHARGE NOTE OB - PATIENT PORTAL LINK FT
You can access the FollowMyHealth Patient Portal offered by [facility] by registering at the following website: [email or web address]. By joining [facility]’s FollowMyHealth portal, you will also be able to view your health information using other applications (apps) compatible with our system.

## 2023-11-03 NOTE — OB PROVIDER H&P - ASSESSMENT
Assessment  38yo  @39w5d admitted for scheduled repeat  section.    Plan  1. Admit to LND. Routine Labs. IVF.  2. For OR  3. Fetus: reactive NST. VTX. No concerns.  4. Prenatal issues: none  5. GBS _  6. Anesthesia consult for OR    Patient discussed with attending physician, Dr. Robert Robb PGY2   Assessment  38yo  @39w5d admitted for scheduled repeat  section and bilateral salpingectomy.    Plan  1. Admit to LND. Routine Labs. IVF.  2. For OR  3. Fetus: reactive NST. VTX. No concerns.  4. Prenatal issues: none  5. GBS positive  6. Anesthesia consult for OR    Patient discussed with attending physician, Dr. Robert Robb PGY2

## 2023-11-04 LAB
BASOPHILS # BLD AUTO: 0.03 K/UL — SIGNIFICANT CHANGE UP (ref 0–0.2)
BASOPHILS # BLD AUTO: 0.03 K/UL — SIGNIFICANT CHANGE UP (ref 0–0.2)
BASOPHILS NFR BLD AUTO: 0.2 % — SIGNIFICANT CHANGE UP (ref 0–2)
BASOPHILS NFR BLD AUTO: 0.2 % — SIGNIFICANT CHANGE UP (ref 0–2)
EOSINOPHIL # BLD AUTO: 0.13 K/UL — SIGNIFICANT CHANGE UP (ref 0–0.5)
EOSINOPHIL # BLD AUTO: 0.13 K/UL — SIGNIFICANT CHANGE UP (ref 0–0.5)
EOSINOPHIL NFR BLD AUTO: 0.8 % — SIGNIFICANT CHANGE UP (ref 0–6)
EOSINOPHIL NFR BLD AUTO: 0.8 % — SIGNIFICANT CHANGE UP (ref 0–6)
HCT VFR BLD CALC: 25.6 % — LOW (ref 34.5–45)
HCT VFR BLD CALC: 25.6 % — LOW (ref 34.5–45)
HGB BLD-MCNC: 8.7 G/DL — LOW (ref 11.5–15.5)
HGB BLD-MCNC: 8.7 G/DL — LOW (ref 11.5–15.5)
IMM GRANULOCYTES NFR BLD AUTO: 0.5 % — SIGNIFICANT CHANGE UP (ref 0–0.9)
IMM GRANULOCYTES NFR BLD AUTO: 0.5 % — SIGNIFICANT CHANGE UP (ref 0–0.9)
LYMPHOCYTES # BLD AUTO: 19.3 % — SIGNIFICANT CHANGE UP (ref 13–44)
LYMPHOCYTES # BLD AUTO: 19.3 % — SIGNIFICANT CHANGE UP (ref 13–44)
LYMPHOCYTES # BLD AUTO: 3.06 K/UL — SIGNIFICANT CHANGE UP (ref 1–3.3)
LYMPHOCYTES # BLD AUTO: 3.06 K/UL — SIGNIFICANT CHANGE UP (ref 1–3.3)
MCHC RBC-ENTMCNC: 29.9 PG — SIGNIFICANT CHANGE UP (ref 27–34)
MCHC RBC-ENTMCNC: 29.9 PG — SIGNIFICANT CHANGE UP (ref 27–34)
MCHC RBC-ENTMCNC: 34 GM/DL — SIGNIFICANT CHANGE UP (ref 32–36)
MCHC RBC-ENTMCNC: 34 GM/DL — SIGNIFICANT CHANGE UP (ref 32–36)
MCV RBC AUTO: 88 FL — SIGNIFICANT CHANGE UP (ref 80–100)
MCV RBC AUTO: 88 FL — SIGNIFICANT CHANGE UP (ref 80–100)
MONOCYTES # BLD AUTO: 1.3 K/UL — HIGH (ref 0–0.9)
MONOCYTES # BLD AUTO: 1.3 K/UL — HIGH (ref 0–0.9)
MONOCYTES NFR BLD AUTO: 8.2 % — SIGNIFICANT CHANGE UP (ref 2–14)
MONOCYTES NFR BLD AUTO: 8.2 % — SIGNIFICANT CHANGE UP (ref 2–14)
NEUTROPHILS # BLD AUTO: 11.25 K/UL — HIGH (ref 1.8–7.4)
NEUTROPHILS # BLD AUTO: 11.25 K/UL — HIGH (ref 1.8–7.4)
NEUTROPHILS NFR BLD AUTO: 71 % — SIGNIFICANT CHANGE UP (ref 43–77)
NEUTROPHILS NFR BLD AUTO: 71 % — SIGNIFICANT CHANGE UP (ref 43–77)
NRBC # BLD: 0 /100 WBCS — SIGNIFICANT CHANGE UP (ref 0–0)
NRBC # BLD: 0 /100 WBCS — SIGNIFICANT CHANGE UP (ref 0–0)
PLATELET # BLD AUTO: 156 K/UL — SIGNIFICANT CHANGE UP (ref 150–400)
PLATELET # BLD AUTO: 156 K/UL — SIGNIFICANT CHANGE UP (ref 150–400)
RBC # BLD: 2.91 M/UL — LOW (ref 3.8–5.2)
RBC # BLD: 2.91 M/UL — LOW (ref 3.8–5.2)
RBC # FLD: 13.9 % — SIGNIFICANT CHANGE UP (ref 10.3–14.5)
RBC # FLD: 13.9 % — SIGNIFICANT CHANGE UP (ref 10.3–14.5)
WBC # BLD: 15.85 K/UL — HIGH (ref 3.8–10.5)
WBC # BLD: 15.85 K/UL — HIGH (ref 3.8–10.5)
WBC # FLD AUTO: 15.85 K/UL — HIGH (ref 3.8–10.5)
WBC # FLD AUTO: 15.85 K/UL — HIGH (ref 3.8–10.5)

## 2023-11-04 RX ORDER — OXYCODONE HYDROCHLORIDE 5 MG/1
5 TABLET ORAL
Refills: 0 | Status: DISCONTINUED | OUTPATIENT
Start: 2023-11-04 | End: 2023-11-06

## 2023-11-04 RX ORDER — FERROUS SULFATE 325(65) MG
325 TABLET ORAL THREE TIMES A DAY
Refills: 0 | Status: DISCONTINUED | OUTPATIENT
Start: 2023-11-04 | End: 2023-11-06

## 2023-11-04 RX ORDER — ASCORBIC ACID 60 MG
500 TABLET,CHEWABLE ORAL THREE TIMES A DAY
Refills: 0 | Status: DISCONTINUED | OUTPATIENT
Start: 2023-11-04 | End: 2023-11-06

## 2023-11-04 RX ADMIN — Medication 500 MILLIGRAM(S): at 21:04

## 2023-11-04 RX ADMIN — Medication 975 MILLIGRAM(S): at 00:15

## 2023-11-04 RX ADMIN — Medication 30 MILLIGRAM(S): at 09:00

## 2023-11-04 RX ADMIN — Medication 975 MILLIGRAM(S): at 18:44

## 2023-11-04 RX ADMIN — Medication 975 MILLIGRAM(S): at 06:18

## 2023-11-04 RX ADMIN — HEPARIN SODIUM 5000 UNIT(S): 5000 INJECTION INTRAVENOUS; SUBCUTANEOUS at 21:04

## 2023-11-04 RX ADMIN — Medication 30 MILLIGRAM(S): at 03:55

## 2023-11-04 RX ADMIN — Medication 325 MILLIGRAM(S): at 11:49

## 2023-11-04 RX ADMIN — Medication 500 MILLIGRAM(S): at 11:49

## 2023-11-04 RX ADMIN — Medication 30 MILLIGRAM(S): at 03:21

## 2023-11-04 RX ADMIN — SIMETHICONE 80 MILLIGRAM(S): 80 TABLET, CHEWABLE ORAL at 11:51

## 2023-11-04 RX ADMIN — Medication 30 MILLIGRAM(S): at 09:30

## 2023-11-04 RX ADMIN — Medication 600 MILLIGRAM(S): at 21:35

## 2023-11-04 RX ADMIN — Medication 975 MILLIGRAM(S): at 18:00

## 2023-11-04 RX ADMIN — Medication 600 MILLIGRAM(S): at 15:45

## 2023-11-04 RX ADMIN — Medication 975 MILLIGRAM(S): at 12:30

## 2023-11-04 RX ADMIN — Medication 25 MICROGRAM(S): at 06:18

## 2023-11-04 RX ADMIN — Medication 975 MILLIGRAM(S): at 11:49

## 2023-11-04 RX ADMIN — OXYCODONE HYDROCHLORIDE 5 MILLIGRAM(S): 5 TABLET ORAL at 23:37

## 2023-11-04 RX ADMIN — Medication 975 MILLIGRAM(S): at 07:04

## 2023-11-04 RX ADMIN — Medication 600 MILLIGRAM(S): at 21:04

## 2023-11-04 RX ADMIN — Medication 325 MILLIGRAM(S): at 21:04

## 2023-11-04 RX ADMIN — HEPARIN SODIUM 5000 UNIT(S): 5000 INJECTION INTRAVENOUS; SUBCUTANEOUS at 09:00

## 2023-11-04 RX ADMIN — Medication 975 MILLIGRAM(S): at 23:37

## 2023-11-04 RX ADMIN — Medication 600 MILLIGRAM(S): at 14:57

## 2023-11-04 NOTE — PROGRESS NOTE ADULT - SUBJECTIVE AND OBJECTIVE BOX
Patient seen and examined at bedside, no acute overnight events. No acute complaints, pain well controlled. Patient is ambulating and tolerating regular diet. Has not yet passed flatus. Voiding spontaneously. Denies CP, SOB, N/V, HA, blurred vision, epigastric pain.    Vital Signs Last 24 Hours  T(C): 36.9 (11-04-23 @ 00:20), Max: 37 (11-03-23 @ 22:11)  HR: 92 (11-04-23 @ 00:20) (85 - 110)  BP: 101/68 (11-04-23 @ 00:20) (88/57 - 126/76)  RR: 18 (11-04-23 @ 00:20) (16 - 18)  SpO2: 97% (11-04-23 @ 00:20) (84% - 100%)    I&O's Summary    03 Nov 2023 07:01  -  04 Nov 2023 03:04  --------------------------------------------------------  IN: 0 mL / OUT: 2509 mL / NET: -2509 mL        Physical Exam:  General: NAD  Abdomen: Soft, expected tenderness, non-distended, fundus firm  Incision: Pfannenstiel incision CDI, steristrips in place  Pelvic: Lochia wnl    Labs:    Blood Type: A Positive  Antibody Screen: Negative  RPR: Negative               11.6   19.84 )-----------( 177      ( 11-03 @ 14:31 )             34.5                13.8   13.41 )-----------( 178      ( 10-19 @ 09:21 )             40.6         MEDICATIONS  (STANDING):  acetaminophen     Tablet .. 975 milliGRAM(s) Oral <User Schedule>  diphtheria/tetanus/pertussis (acellular) Vaccine (Adacel) 0.5 milliLiter(s) IntraMuscular once  heparin   Injectable 5000 Unit(s) SubCutaneous every 12 hours  ibuprofen  Tablet. 600 milliGRAM(s) Oral every 6 hours  influenza   Vaccine 0.5 milliLiter(s) IntraMuscular once  ketorolac   Injectable 30 milliGRAM(s) IV Push every 6 hours  lactated ringers. 1000 milliLiter(s) (125 mL/Hr) IV Continuous <Continuous>  levothyroxine 25 MICROGram(s) Oral daily  morphine PF Spinal 0.1 milliGRAM(s) IntraThecal. once  oxytocin Infusion 333.333 milliUNIT(s)/Min (1000 mL/Hr) IV Continuous <Continuous>    MEDICATIONS  (PRN):  dexAMETHasone  Injectable 4 milliGRAM(s) IV Push every 6 hours PRN Nausea  diphenhydrAMINE 25 milliGRAM(s) Oral every 6 hours PRN Pruritus  lanolin Ointment 1 Application(s) Topical every 6 hours PRN Sore Nipples  magnesium hydroxide Suspension 30 milliLiter(s) Oral two times a day PRN Constipation  nalbuphine Injectable 2.5 milliGRAM(s) IV Push every 6 hours PRN Pruritus  naloxone Injectable 0.1 milliGRAM(s) IV Push every 3 minutes PRN For ANY of the following changes in patient status:  A. Breaths Per Minute LESS THAN 10, B. Oxygen saturation LESS THAN 90%, C. Sedation score of 6 for Stop After: 4 Times  ondansetron Injectable 4 milliGRAM(s) IV Push every 6 hours PRN Nausea  oxyCODONE    IR 5 milliGRAM(s) Oral once PRN Moderate to Severe Pain (4-10)  oxyCODONE    IR 5 milliGRAM(s) Oral every 3 hours PRN Moderate to Severe Pain (4-10)  oxyCODONE    IR 5 milliGRAM(s) Oral every 3 hours PRN Mild Pain (1 - 3)  simethicone 80 milliGRAM(s) Chew every 4 hours PRN Gas       36yo  POD#1 s/p rLTCS+BS  Patient seen and examined at bedside, no acute overnight events. No acute complaints, pain well controlled. Patient is ambulating and tolerating regular diet. Has not yet passed flatus. Voiding spontaneously. Denies CP, SOB, N/V, HA, blurred vision, epigastric pain.    Vital Signs Last 24 Hours  T(C): 36.9 (23 @ 00:20), Max: 37 (23 @ 22:11)  HR: 92 (23 @ 00:20) (85 - 110)  BP: 101/68 (23 @ 00:20) (88/57 - 126/76)  RR: 18 (23 @ 00:20) (16 - 18)  SpO2: 97% (23 @ 00:20) (84% - 100%)    I&O's Summary    2023 07:01  -  2023 03:04  --------------------------------------------------------  IN: 0 mL / OUT: 2509 mL / NET: -2509 mL        Physical Exam:  General: NAD  Abdomen: Soft, expected tenderness, non-distended, fundus firm  Incision: Pfannenstiel incision CDI, steristrips in place  Pelvic: Lochia wnl    Labs:    Blood Type: A Positive  Antibody Screen: Negative  RPR: Negative               11.6   19.84 )-----------( 177      (  @ 14:31 )             34.5                13.8   13.41 )-----------( 178      ( 10-19 @ 09:21 )             40.6         MEDICATIONS  (STANDING):  acetaminophen     Tablet .. 975 milliGRAM(s) Oral <User Schedule>  diphtheria/tetanus/pertussis (acellular) Vaccine (Adacel) 0.5 milliLiter(s) IntraMuscular once  heparin   Injectable 5000 Unit(s) SubCutaneous every 12 hours  ibuprofen  Tablet. 600 milliGRAM(s) Oral every 6 hours  influenza   Vaccine 0.5 milliLiter(s) IntraMuscular once  ketorolac   Injectable 30 milliGRAM(s) IV Push every 6 hours  lactated ringers. 1000 milliLiter(s) (125 mL/Hr) IV Continuous <Continuous>  levothyroxine 25 MICROGram(s) Oral daily  morphine PF Spinal 0.1 milliGRAM(s) IntraThecal. once  oxytocin Infusion 333.333 milliUNIT(s)/Min (1000 mL/Hr) IV Continuous <Continuous>    MEDICATIONS  (PRN):  dexAMETHasone  Injectable 4 milliGRAM(s) IV Push every 6 hours PRN Nausea  diphenhydrAMINE 25 milliGRAM(s) Oral every 6 hours PRN Pruritus  lanolin Ointment 1 Application(s) Topical every 6 hours PRN Sore Nipples  magnesium hydroxide Suspension 30 milliLiter(s) Oral two times a day PRN Constipation  nalbuphine Injectable 2.5 milliGRAM(s) IV Push every 6 hours PRN Pruritus  naloxone Injectable 0.1 milliGRAM(s) IV Push every 3 minutes PRN For ANY of the following changes in patient status:  A. Breaths Per Minute LESS THAN 10, B. Oxygen saturation LESS THAN 90%, C. Sedation score of 6 for Stop After: 4 Times  ondansetron Injectable 4 milliGRAM(s) IV Push every 6 hours PRN Nausea  oxyCODONE    IR 5 milliGRAM(s) Oral once PRN Moderate to Severe Pain (4-10)  oxyCODONE    IR 5 milliGRAM(s) Oral every 3 hours PRN Moderate to Severe Pain (4-10)  oxyCODONE    IR 5 milliGRAM(s) Oral every 3 hours PRN Mild Pain (1 - 3)  simethicone 80 milliGRAM(s) Chew every 4 hours PRN Gas

## 2023-11-04 NOTE — PROGRESS NOTE ADULT - SUBJECTIVE AND OBJECTIVE BOX
Day 1 of Anesthesia Pain Management Service    SUBJECTIVE:  Pain Scale Score:          [X] Refer to charted pain scores    THERAPY:    s/p neuraxial PF morphine    MEDICATIONS  (STANDING):  acetaminophen     Tablet .. 975 milliGRAM(s) Oral <User Schedule>  ascorbic acid 500 milliGRAM(s) Oral three times a day  diphtheria/tetanus/pertussis (acellular) Vaccine (Adacel) 0.5 milliLiter(s) IntraMuscular once  ferrous    sulfate 325 milliGRAM(s) Oral three times a day  heparin   Injectable 5000 Unit(s) SubCutaneous every 12 hours  ibuprofen  Tablet. 600 milliGRAM(s) Oral every 6 hours  influenza   Vaccine 0.5 milliLiter(s) IntraMuscular once  lactated ringers. 1000 milliLiter(s) (125 mL/Hr) IV Continuous <Continuous>  levothyroxine 25 MICROGram(s) Oral daily  oxytocin Infusion 333.333 milliUNIT(s)/Min (1000 mL/Hr) IV Continuous <Continuous>    MEDICATIONS  (PRN):  dexAMETHasone  Injectable 4 milliGRAM(s) IV Push every 6 hours PRN Nausea  diphenhydrAMINE 25 milliGRAM(s) Oral every 6 hours PRN Pruritus  lanolin Ointment 1 Application(s) Topical every 6 hours PRN Sore Nipples  magnesium hydroxide Suspension 30 milliLiter(s) Oral two times a day PRN Constipation  nalbuphine Injectable 2.5 milliGRAM(s) IV Push every 6 hours PRN Pruritus  naloxone Injectable 0.1 milliGRAM(s) IV Push every 3 minutes PRN For ANY of the following changes in patient status:  A. Breaths Per Minute LESS THAN 10, B. Oxygen saturation LESS THAN 90%, C. Sedation score of 6 for Stop After: 4 Times  ondansetron Injectable 4 milliGRAM(s) IV Push every 6 hours PRN Nausea  oxyCODONE    IR 5 milliGRAM(s) Oral every 3 hours PRN Moderate to Severe Pain (4-10)  oxyCODONE    IR 5 milliGRAM(s) Oral once PRN Moderate to Severe Pain (4-10)  simethicone 80 milliGRAM(s) Chew every 4 hours PRN Gas      OBJECTIVE:    Sedation:        	[X] Alert	[ ] Drowsy	[ ] Arousable      [ ] Asleep       [ ] Unresponsive    Side Effects:	[X] None	[ ] Nausea	[ ] Vomiting         [ ] Pruritus  		[ ] Weakness            [ ] Numbness	          [ ] Other:    Vital Signs Last 24 Hrs  T(C): 37.1 (04 Nov 2023 13:23), Max: 37.1 (04 Nov 2023 13:23)  T(F): 98.8 (04 Nov 2023 13:23), Max: 98.8 (04 Nov 2023 13:23)  HR: 96 (04 Nov 2023 13:23) (86 - 96)  BP: 115/77 (04 Nov 2023 13:23) (92/60 - 115/77)  BP(mean): --  RR: 18 (04 Nov 2023 13:23) (18 - 18)  SpO2: 99% (04 Nov 2023 13:23) (97% - 99%)    Parameters below as of 04 Nov 2023 13:23  Patient On (Oxygen Delivery Method): room air        ASSESSMENT/ PLAN  [X] Patient transitioned to prn analgesics  [X] Pain management per primary service, pain service to sign off   [X]Documentation and Verification of current medications

## 2023-11-04 NOTE — PROGRESS NOTE ADULT - ASSESSMENT
36yo  POD#1 s/p rLTCS+BS. QBL: 1644ml. Vitals stable. Pt doing well postoperatively    #postpartum  - f/u AM CBC  - continue with PO analgesia  - increase ambulation  - continue regular diet  - encourage incentive spirometry    Anali De León PGY1 38yo  POD#1 s/p rLTCS+BS. QBL: 1644ml. Vitals stable. Incision clean dry and intact. Pt doing well postoperatively    #postpartum  - f/u AM CBC  - continue with PO analgesia  - increase ambulation  - continue regular diet  - encourage incentive spirometry    Anali De León PGY1

## 2023-11-05 RX ADMIN — Medication 600 MILLIGRAM(S): at 02:02

## 2023-11-05 RX ADMIN — Medication 600 MILLIGRAM(S): at 20:54

## 2023-11-05 RX ADMIN — Medication 975 MILLIGRAM(S): at 17:40

## 2023-11-05 RX ADMIN — Medication 600 MILLIGRAM(S): at 15:30

## 2023-11-05 RX ADMIN — Medication 600 MILLIGRAM(S): at 02:45

## 2023-11-05 RX ADMIN — HEPARIN SODIUM 5000 UNIT(S): 5000 INJECTION INTRAVENOUS; SUBCUTANEOUS at 09:01

## 2023-11-05 RX ADMIN — Medication 975 MILLIGRAM(S): at 13:09

## 2023-11-05 RX ADMIN — Medication 500 MILLIGRAM(S): at 06:02

## 2023-11-05 RX ADMIN — Medication 500 MILLIGRAM(S): at 13:54

## 2023-11-05 RX ADMIN — Medication 975 MILLIGRAM(S): at 06:42

## 2023-11-05 RX ADMIN — Medication 325 MILLIGRAM(S): at 21:00

## 2023-11-05 RX ADMIN — Medication 600 MILLIGRAM(S): at 09:01

## 2023-11-05 RX ADMIN — Medication 25 MICROGRAM(S): at 06:02

## 2023-11-05 RX ADMIN — OXYCODONE HYDROCHLORIDE 5 MILLIGRAM(S): 5 TABLET ORAL at 00:30

## 2023-11-05 RX ADMIN — Medication 975 MILLIGRAM(S): at 23:26

## 2023-11-05 RX ADMIN — MAGNESIUM HYDROXIDE 30 MILLILITER(S): 400 TABLET, CHEWABLE ORAL at 06:17

## 2023-11-05 RX ADMIN — Medication 600 MILLIGRAM(S): at 14:53

## 2023-11-05 RX ADMIN — Medication 975 MILLIGRAM(S): at 00:30

## 2023-11-05 RX ADMIN — Medication 325 MILLIGRAM(S): at 06:02

## 2023-11-05 RX ADMIN — Medication 500 MILLIGRAM(S): at 21:00

## 2023-11-05 RX ADMIN — Medication 975 MILLIGRAM(S): at 13:40

## 2023-11-05 RX ADMIN — Medication 975 MILLIGRAM(S): at 06:02

## 2023-11-05 RX ADMIN — Medication 600 MILLIGRAM(S): at 21:45

## 2023-11-05 RX ADMIN — Medication 325 MILLIGRAM(S): at 13:09

## 2023-11-05 RX ADMIN — Medication 600 MILLIGRAM(S): at 09:31

## 2023-11-05 RX ADMIN — HEPARIN SODIUM 5000 UNIT(S): 5000 INJECTION INTRAVENOUS; SUBCUTANEOUS at 20:54

## 2023-11-05 NOTE — PROGRESS NOTE ADULT - SUBJECTIVE AND OBJECTIVE BOX
OB Progress Note: LTCS+BS, POD#2    S: 36yo POD#2 s/p LTCS+BS. Pain is well controlled. She is tolerating a regular diet and passing flatus. She is voiding spontaneously, and ambulating without difficulty. Denies CP/SOB. Denies lightheadedness/dizziness. Denies N/V.    O:  Vitals:  Vital Signs Last 24 Hrs  T(C): 36.8 (04 Nov 2023 21:35), Max: 37.1 (04 Nov 2023 13:23)  T(F): 98.2 (04 Nov 2023 21:35), Max: 98.8 (04 Nov 2023 13:23)  HR: 99 (04 Nov 2023 21:35) (86 - 99)  BP: 112/77 (04 Nov 2023 21:35) (92/60 - 115/77)  BP(mean): --  RR: 18 (04 Nov 2023 21:35) (18 - 18)  SpO2: 97% (04 Nov 2023 21:35) (97% - 99%)    Parameters below as of 04 Nov 2023 21:35  Patient On (Oxygen Delivery Method): room air        MEDICATIONS  (STANDING):  acetaminophen     Tablet .. 975 milliGRAM(s) Oral <User Schedule>  ascorbic acid 500 milliGRAM(s) Oral three times a day  diphtheria/tetanus/pertussis (acellular) Vaccine (Adacel) 0.5 milliLiter(s) IntraMuscular once  ferrous    sulfate 325 milliGRAM(s) Oral three times a day  heparin   Injectable 5000 Unit(s) SubCutaneous every 12 hours  ibuprofen  Tablet. 600 milliGRAM(s) Oral every 6 hours  influenza   Vaccine 0.5 milliLiter(s) IntraMuscular once  lactated ringers. 1000 milliLiter(s) (125 mL/Hr) IV Continuous <Continuous>  levothyroxine 25 MICROGram(s) Oral daily  oxytocin Infusion 333.333 milliUNIT(s)/Min (1000 mL/Hr) IV Continuous <Continuous>      MEDICATIONS  (PRN):  dexAMETHasone  Injectable 4 milliGRAM(s) IV Push every 6 hours PRN Nausea  diphenhydrAMINE 25 milliGRAM(s) Oral every 6 hours PRN Pruritus  lanolin Ointment 1 Application(s) Topical every 6 hours PRN Sore Nipples  magnesium hydroxide Suspension 30 milliLiter(s) Oral two times a day PRN Constipation  nalbuphine Injectable 2.5 milliGRAM(s) IV Push every 6 hours PRN Pruritus  naloxone Injectable 0.1 milliGRAM(s) IV Push every 3 minutes PRN For ANY of the following changes in patient status:  A. Breaths Per Minute LESS THAN 10, B. Oxygen saturation LESS THAN 90%, C. Sedation score of 6 for Stop After: 4 Times  ondansetron Injectable 4 milliGRAM(s) IV Push every 6 hours PRN Nausea  oxyCODONE    IR 5 milliGRAM(s) Oral every 3 hours PRN Moderate to Severe Pain (4-10)  oxyCODONE    IR 5 milliGRAM(s) Oral once PRN Moderate to Severe Pain (4-10)  simethicone 80 milliGRAM(s) Chew every 4 hours PRN Gas      Labs:  Blood type: A Positive  Rubella IgG: RPR: Negative                          8.7<L>   15.85<H> >-----------< 156    ( 11-04 @ 06:50 )             25.6<L>                        11.6   19.84<H> >-----------< 177    ( 11-03 @ 14:31 )             34.5                  PE:  General: NAD  Abdomen: Soft, appropriately tender, incision c/d/i.  Extremities: No erythema, no pitting edema

## 2023-11-05 NOTE — PROGRESS NOTE ADULT - ASSESSMENT
A/P: 38yo POD#2 s/p LTCS c/b PPH 1644.  Patient is stable and doing well post-operatively.      #Postop from LTCS  - Continue regular diet.  - Increase ambulation.  - Continue motrin, tylenol, oxycodone PRN for pain control.   - Continue to monitor for lightheadedness/dizziness    Arabella Norman MD PGY1

## 2023-11-06 VITALS
DIASTOLIC BLOOD PRESSURE: 70 MMHG | HEART RATE: 86 BPM | OXYGEN SATURATION: 99 % | SYSTOLIC BLOOD PRESSURE: 118 MMHG | TEMPERATURE: 98 F | RESPIRATION RATE: 18 BRPM

## 2023-11-06 LAB
HCT VFR BLD CALC: 24.1 % — LOW (ref 34.5–45)
HCT VFR BLD CALC: 24.1 % — LOW (ref 34.5–45)
HGB BLD-MCNC: 8.3 G/DL — LOW (ref 11.5–15.5)
HGB BLD-MCNC: 8.3 G/DL — LOW (ref 11.5–15.5)

## 2023-11-06 PROCEDURE — 36415 COLL VENOUS BLD VENIPUNCTURE: CPT

## 2023-11-06 PROCEDURE — 85027 COMPLETE CBC AUTOMATED: CPT

## 2023-11-06 PROCEDURE — 85025 COMPLETE CBC W/AUTO DIFF WBC: CPT

## 2023-11-06 PROCEDURE — C1889: CPT

## 2023-11-06 PROCEDURE — 86850 RBC ANTIBODY SCREEN: CPT

## 2023-11-06 PROCEDURE — 86901 BLOOD TYPING SEROLOGIC RH(D): CPT

## 2023-11-06 PROCEDURE — 86780 TREPONEMA PALLIDUM: CPT

## 2023-11-06 PROCEDURE — 85730 THROMBOPLASTIN TIME PARTIAL: CPT

## 2023-11-06 PROCEDURE — 59050 FETAL MONITOR W/REPORT: CPT

## 2023-11-06 PROCEDURE — 86900 BLOOD TYPING SEROLOGIC ABO: CPT

## 2023-11-06 PROCEDURE — 85014 HEMATOCRIT: CPT

## 2023-11-06 PROCEDURE — 88302 TISSUE EXAM BY PATHOLOGIST: CPT

## 2023-11-06 PROCEDURE — 85384 FIBRINOGEN ACTIVITY: CPT

## 2023-11-06 PROCEDURE — 59025 FETAL NON-STRESS TEST: CPT

## 2023-11-06 PROCEDURE — 85018 HEMOGLOBIN: CPT

## 2023-11-06 PROCEDURE — 85610 PROTHROMBIN TIME: CPT

## 2023-11-06 RX ORDER — OXYCODONE HYDROCHLORIDE 5 MG/1
1 TABLET ORAL
Qty: 0 | Refills: 0 | DISCHARGE
Start: 2023-11-06

## 2023-11-06 RX ORDER — IBUPROFEN 200 MG
1 TABLET ORAL
Qty: 0 | Refills: 0 | DISCHARGE
Start: 2023-11-06

## 2023-11-06 RX ORDER — ACETAMINOPHEN 500 MG
3 TABLET ORAL
Qty: 0 | Refills: 0 | DISCHARGE
Start: 2023-11-06

## 2023-11-06 RX ORDER — FERROUS SULFATE 325(65) MG
1 TABLET ORAL
Qty: 0 | Refills: 0 | DISCHARGE
Start: 2023-11-06

## 2023-11-06 RX ADMIN — Medication 975 MILLIGRAM(S): at 00:20

## 2023-11-06 RX ADMIN — Medication 975 MILLIGRAM(S): at 11:42

## 2023-11-06 RX ADMIN — Medication 500 MILLIGRAM(S): at 05:51

## 2023-11-06 RX ADMIN — Medication 600 MILLIGRAM(S): at 09:16

## 2023-11-06 RX ADMIN — Medication 975 MILLIGRAM(S): at 05:51

## 2023-11-06 RX ADMIN — Medication 600 MILLIGRAM(S): at 03:58

## 2023-11-06 RX ADMIN — Medication 325 MILLIGRAM(S): at 05:52

## 2023-11-06 RX ADMIN — Medication 600 MILLIGRAM(S): at 08:46

## 2023-11-06 RX ADMIN — Medication 25 MICROGRAM(S): at 05:51

## 2023-11-06 RX ADMIN — Medication 975 MILLIGRAM(S): at 12:12

## 2023-11-06 NOTE — PROVIDER CONTACT NOTE (OTHER) - RECOMMENDATIONS
patient assessed By Dr Briones, ordered morning blood work and patient notified to report if any more clots passed.

## 2023-11-06 NOTE — PROGRESS NOTE ADULT - SUBJECTIVE AND OBJECTIVE BOX
36yo  POD#1 s/p rLTCS+BS  Patient seen and examined at bedside, no acute overnight events. No acute complaints, pain well controlled. Patient is ambulating, voiding spontaneously, passing flatus, and tolerating regular diet. Denies CP, SOB, N/V, HA, blurred vision, epigastric pain.    Vital Signs Last 24 Hours  T(C): 36.9 (23 @ 21:32), Max: 36.9 (23 @ 06:07)  HR: 91 (23 @ 21:32) (81 - 92)  BP: 100/68 (23 @ 21:32) (97/64 - 109/74)  RR: 18 (23 @ 21:32) (18 - 18)  SpO2: 100% (23 @ 21:32) (99% - 100%)    Physical Exam:  General: NAD  Abdomen: Soft, expected tenderness, non-distended, fundus firm  Incision: Pfannenstiel incision CDI, steristrips in place  Pelvic: Lochia wnl    Labs:    Blood Type: A Positive  Antibody Screen: Negative  RPR: Negative               8.7    15.85 )-----------( 156      (  @ 06:50 )             25.6                11.6   19.84 )-----------( 177      (  @ 14:31 )             34.5                13.8   13.41 )-----------( 178      ( 10-19 @ 09:21 )             40.6         MEDICATIONS  (STANDING):  acetaminophen     Tablet .. 975 milliGRAM(s) Oral <User Schedule>  ascorbic acid 500 milliGRAM(s) Oral three times a day  diphtheria/tetanus/pertussis (acellular) Vaccine (Adacel) 0.5 milliLiter(s) IntraMuscular once  ferrous    sulfate 325 milliGRAM(s) Oral three times a day  heparin   Injectable 5000 Unit(s) SubCutaneous every 12 hours  ibuprofen  Tablet. 600 milliGRAM(s) Oral every 6 hours  influenza   Vaccine 0.5 milliLiter(s) IntraMuscular once  lactated ringers. 1000 milliLiter(s) (125 mL/Hr) IV Continuous <Continuous>  levothyroxine 25 MICROGram(s) Oral daily  oxytocin Infusion 333.333 milliUNIT(s)/Min (1000 mL/Hr) IV Continuous <Continuous>    MEDICATIONS  (PRN):  dexAMETHasone  Injectable 4 milliGRAM(s) IV Push every 6 hours PRN Nausea  diphenhydrAMINE 25 milliGRAM(s) Oral every 6 hours PRN Pruritus  lanolin Ointment 1 Application(s) Topical every 6 hours PRN Sore Nipples  magnesium hydroxide Suspension 30 milliLiter(s) Oral two times a day PRN Constipation  nalbuphine Injectable 2.5 milliGRAM(s) IV Push every 6 hours PRN Pruritus  naloxone Injectable 0.1 milliGRAM(s) IV Push every 3 minutes PRN For ANY of the following changes in patient status:  A. Breaths Per Minute LESS THAN 10, B. Oxygen saturation LESS THAN 90%, C. Sedation score of 6 for Stop After: 4 Times  ondansetron Injectable 4 milliGRAM(s) IV Push every 6 hours PRN Nausea  oxyCODONE    IR 5 milliGRAM(s) Oral once PRN Moderate to Severe Pain (4-10)  oxyCODONE    IR 5 milliGRAM(s) Oral every 3 hours PRN Moderate to Severe Pain (4-10)  simethicone 80 milliGRAM(s) Chew every 4 hours PRN Gas       38yo  POD#3 s/p rLTCS+BS  Patient seen and examined at bedside. Pain well controlled. Patient is ambulating, voiding spontaneously, passing flatus, and tolerating regular diet. Denies CP, SOB, N/V, HA, blurred vision, epigastric pain.    Vital Signs Last 24 Hours  T(C): 36.9 (23 @ 21:32), Max: 36.9 (23 @ 06:07)  HR: 91 (23 @ 21:32) (81 - 92)  BP: 100/68 (23 @ 21:32) (97/64 - 109/74)  RR: 18 (23 @ 21:32) (18 - 18)  SpO2: 100% (23 @ 21:32) (99% - 100%)    Physical Exam:  General: NAD  Abdomen: Soft, expected tenderness, non-distended, fundus firm  Incision: Pfannenstiel incision CDI, steristrips in place  Pelvic: Lochia wnl    Labs:    Blood Type: A Positive  Antibody Screen: Negative  RPR: Negative               8.7    15.85 )-----------( 156      (  @ 06:50 )             25.6                11.6   19.84 )-----------( 177      (  @ 14:31 )             34.5                13.8   13.41 )-----------( 178      ( 10-19 @ 09:21 )             40.6         MEDICATIONS  (STANDING):  acetaminophen     Tablet .. 975 milliGRAM(s) Oral <User Schedule>  ascorbic acid 500 milliGRAM(s) Oral three times a day  diphtheria/tetanus/pertussis (acellular) Vaccine (Adacel) 0.5 milliLiter(s) IntraMuscular once  ferrous    sulfate 325 milliGRAM(s) Oral three times a day  heparin   Injectable 5000 Unit(s) SubCutaneous every 12 hours  ibuprofen  Tablet. 600 milliGRAM(s) Oral every 6 hours  influenza   Vaccine 0.5 milliLiter(s) IntraMuscular once  lactated ringers. 1000 milliLiter(s) (125 mL/Hr) IV Continuous <Continuous>  levothyroxine 25 MICROGram(s) Oral daily  oxytocin Infusion 333.333 milliUNIT(s)/Min (1000 mL/Hr) IV Continuous <Continuous>    MEDICATIONS  (PRN):  dexAMETHasone  Injectable 4 milliGRAM(s) IV Push every 6 hours PRN Nausea  diphenhydrAMINE 25 milliGRAM(s) Oral every 6 hours PRN Pruritus  lanolin Ointment 1 Application(s) Topical every 6 hours PRN Sore Nipples  magnesium hydroxide Suspension 30 milliLiter(s) Oral two times a day PRN Constipation  nalbuphine Injectable 2.5 milliGRAM(s) IV Push every 6 hours PRN Pruritus  naloxone Injectable 0.1 milliGRAM(s) IV Push every 3 minutes PRN For ANY of the following changes in patient status:  A. Breaths Per Minute LESS THAN 10, B. Oxygen saturation LESS THAN 90%, C. Sedation score of 6 for Stop After: 4 Times  ondansetron Injectable 4 milliGRAM(s) IV Push every 6 hours PRN Nausea  oxyCODONE    IR 5 milliGRAM(s) Oral once PRN Moderate to Severe Pain (4-10)  oxyCODONE    IR 5 milliGRAM(s) Oral every 3 hours PRN Moderate to Severe Pain (4-10)  simethicone 80 milliGRAM(s) Chew every 4 hours PRN Gas

## 2023-11-06 NOTE — PROGRESS NOTE ADULT - ATTENDING COMMENTS
Pt feels improved-more pain on the right side than her left  VSS AF  Funuds firm U-2  inc: C/D/I  ext: no edema or cords  A/P: POD 2 s/p repeat  delivery  Routine care  Pt wants to go home in the AM  Cabrera MD
Pt seen on am rounds  no HA, CP or SOB or dizziness  VSS AF   Fundus firm U-2  inc: C/D I  ext: no edema or cords  H/H 8.7/25.6  A/P: POD 1 s/p repeat  delivery  anemia from acute surgical blood loss-asymptomatic  Iron  Routine care  Tegan Taylor MD
Pt seen on am rounds and note reviewed.   She passed a golf sized clot whileon the toilet last night  VB has been ok otherwise    VSS AF    Fundus firm U-2   inc: C/D/I  ext: no cords/edema    H/H repeat is pending    A/P POD 3 s/p repeat  delivery and B/L salpingectomy  -check CBC  -d/c home today pending CBC  routine care  Tegan Taylor MD

## 2023-11-06 NOTE — PROVIDER CONTACT NOTE (OTHER) - SITUATION
patient passed clot, unable to determine size or shape, sank to bottom of toilet. fundus firm. no active bleeding. patient alert and active.

## 2023-11-06 NOTE — PROGRESS NOTE ADULT - ASSESSMENT
36yo  POD#1 s/p rLTCS+BS c/b PPH. QBL: 1644ml. Vitals stable. Pt doing well postoperatively    #postpartum  - continue with PO analgesia  - increase ambulation  - continue regular diet  - encourage incentive spirometry  - d/c planning    Anali De León PGY1 38yo  POD#3 s/p rLTCS+BS c/b PPH. QBL: 1644ml. Vitals stable. Pt reported passing two golf ball sized clots simultaneously while using the bathroom early this morning. Minimal vaginal bleeding afterwards. Pt denies any anemia symptoms. Otherwise doing well postoperatively    #postpartum  - f/u AM H/H  - continue with PO analgesia  - increase ambulation  - continue regular diet  - encourage incentive spirometry    Anali De León PGY1

## 2023-11-09 LAB
SURGICAL PATHOLOGY STUDY: SIGNIFICANT CHANGE UP
SURGICAL PATHOLOGY STUDY: SIGNIFICANT CHANGE UP

## 2024-01-05 NOTE — OB PROVIDER H&P - NSCORESITESY/N_GEN_A_CORE_RD
Pt presented to GI clinic this AM for monthly vitamin B-12 injection. Please see MAR for administration details.    No

## 2024-04-08 PROBLEM — E03.9 HYPOTHYROIDISM, UNSPECIFIED: Chronic | Status: ACTIVE | Noted: 2023-10-19

## 2024-04-10 ENCOUNTER — RESULT REVIEW (OUTPATIENT)
Age: 39
End: 2024-04-10

## 2024-04-16 ENCOUNTER — APPOINTMENT (OUTPATIENT)
Dept: ORTHOPEDIC SURGERY | Facility: CLINIC | Age: 39
End: 2024-04-16
Payer: COMMERCIAL

## 2024-04-16 DIAGNOSIS — M25.852 OTHER SPECIFIED JOINT DISORDERS, LEFT HIP: ICD-10-CM

## 2024-04-16 PROCEDURE — 99204 OFFICE O/P NEW MOD 45 MIN: CPT

## 2024-04-16 PROCEDURE — 73503 X-RAY EXAM HIP UNI 4/> VIEWS: CPT | Mod: LT

## 2024-04-16 NOTE — DISCUSSION/SUMMARY
[de-identified] : We had a thorough discussion regarding the nature of her pain, the pathophysiology, as well as all treatment options. Considering the patient's current presentation of pain, physical exam, and radiographs an MRI is indicated at this time for the left hip and pelvis. A prescription for this was given to the patient. We will go over the MRI results with her upon obtaining the results in the office and advise her of further treatment options. She agrees with the above plan and all questions were answered.

## 2024-04-16 NOTE — PHYSICAL EXAM
[de-identified] : General: Well appearing, no acute distress Neurologic: A&Ox3, No focal deficits Head: NCAT without abrasions, lacerations, or ecchymosis to head, face, or scalp Eyes: No scleral icterus, no gross abnormalities Respiratory: Equal chest wall expansion bilaterally, no accessory muscle use Lymphatic: No lymphadenopathy palpated Skin: Warm and dry Psychiatric: Normal mood and affect   Examination of the Left hip reveals no obvious deformity or leg length discrepancy. There is no swelling noted. The patient is tender to palpation over the greater trochanter, gluteus medius and minimus. The patients range of motion is to 110 degrees of hip flexion, 40 degrees of abduction, 40 degrees of abduction, 20 degrees of adduction, 15 degrees of internal rotation and 35 degrees of external rotation. The patient has 5/5 strength to resisted hip flexion, abduction and adduction. Glute weakness, no pain. The patient has a negative EILEEN and positive FADIR Test. Positive [place] Test. No signs of sports hernia, no pain with crunch. Mildly positive resisted SLR test at 30 degrees of hip flexion ([facility]). Negative Piriformis Test. No SI joint instability. There is no pain with logrolling. The calf and thigh are soft and nontender bilaterally. The patient is grossly neurovascularly intact distally.    Slight Trendelenburg gait on the left.  [de-identified] : The following radiographs were ordered and read by me during this patient's visit. I reviewed each radiograph in detail with the patient and discussed the findings as highlighted below. 3 views of the pelvis and 2 views of the left hip were obtained today that show no fracture, dislocation. Calcification noted near the ASIS. Mild overgrowth of the acetabulum consistent with hip impingement.

## 2024-04-16 NOTE — HISTORY OF PRESENT ILLNESS
[de-identified] : ROBBY is a 38 year female here today for left hip pain. She notes that this pain has persisted for about two months. She endorses this pain deep in the hip. She denies groin pain. She was ref'd here by Dr. Watts. She states that she had her second child about five months ago by . She reports significant edema post-partum due to the complexity of her case. She has pain with walking. She has a hx of scoliosis. 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

## 2024-04-16 NOTE — ADDENDUM
[FreeTextEntry1] : Documented by Eulalia Horan acting as a scribe for Dr. Pierre on 04/16/2024. All medical record entries made by the Scribe were at my, Dr. Pierre's, direction and personally dictated by me on 04/16/2024. I have reviewed the chart and agree that the record accurately reflects my personal performance of the history, physical exam, procedure and imaging.

## 2024-04-16 NOTE — CONSULT LETTER
[Dear  ___] : Dear  [unfilled], [Consult Letter:] : I had the pleasure of evaluating your patient, [unfilled]. [Please see my note below.] : Please see my note below. [Sincerely,] : Sincerely, [FreeTextEntry3] : Dr. Mario Pierre

## 2024-05-30 ENCOUNTER — NON-APPOINTMENT (OUTPATIENT)
Age: 39
End: 2024-05-30

## (undated) DEVICE — VACUUM CURETTE BUSSE HOSP CURVED 9MM

## (undated) DEVICE — VACUUM CURETTE BERKLEY OLYMPUS CURVED 7MM

## (undated) DEVICE — VACUUM CURETTE BERKLEY OLYMPUS F TIP 6MM

## (undated) DEVICE — VACUUM CURETTE BUSSE HOSP CURVED 10MM

## (undated) DEVICE — VACUUM CURETTE BUSSE HOSP CURVED 14MM

## (undated) DEVICE — VACUUM CURETTE BERKLEY OLYMPUS CURVED 8MM

## (undated) DEVICE — WARMING BLANKET UPPER ADULT

## (undated) DEVICE — GLV 6.5 PROTEXIS (WHITE)

## (undated) DEVICE — VACUUM CURETTE BERKLEY OLYMPUS CURVED 16MM X 1/2"

## (undated) DEVICE — DRAPE LIGHT HANDLE COVER (GREEN)

## (undated) DEVICE — VACUUM CURETTE BUSSE HOSP STRAIGHT 7MM

## (undated) DEVICE — VACUUM CURETTE BERKLEY OLYMPUS CURVED 11MM

## (undated) DEVICE — PACK LITHOTOMY

## (undated) DEVICE — POSITIONER FOAM EGG CRATE ULNAR 2PCS (PINK)

## (undated) DEVICE — TUBING UTERINE ASPIRATION 3/8" X 6FT W/O ADAPTER

## (undated) DEVICE — VACUUM CURETTE BUSSE HOSP CURVED 11MM

## (undated) DEVICE — SOCK SPECIMEN 3/8"-1/2" MALE PORT

## (undated) DEVICE — VENODYNE/SCD SLEEVE CALF MEDIUM

## (undated) DEVICE — VACUUM CURETTE BERKLEY OLYMPUS CURVED 12MM

## (undated) DEVICE — VACUUM CURETTE BUSSE HOSP CURVED 12MM

## (undated) DEVICE — VACUUM CURETTE BERKLEY OLYMPUS CURVED 9MM

## (undated) DEVICE — VACUUM CURETTE MEDGYN CURVED 13MM

## (undated) DEVICE — POSITIONER PATIENT SAFETY STRAP 3X60"

## (undated) DEVICE — GLV 7 PROTEXIS (WHITE)

## (undated) DEVICE — SOL IRR POUR NS 0.9% 500ML

## (undated) DEVICE — Device